# Patient Record
Sex: MALE | Race: WHITE | NOT HISPANIC OR LATINO | URBAN - METROPOLITAN AREA
[De-identification: names, ages, dates, MRNs, and addresses within clinical notes are randomized per-mention and may not be internally consistent; named-entity substitution may affect disease eponyms.]

---

## 2020-04-27 PROBLEM — R19.4 CHANGE IN BOWEL HABITS: Status: INACTIVE | Noted: 2020-02-03

## 2020-04-27 PROBLEM — K57.30 DVRTCLOS OF LG INT W/O PERFORATION OR ABSCESS W/O BLEEDING: Status: INACTIVE | Noted: 2020-02-03

## 2020-04-27 PROBLEM — K92.2 GASTROINTESTINAL HEMORRHAGE, UNSPECIFIED: Status: INACTIVE | Noted: 2017-07-13

## 2020-04-27 PROBLEM — K51.90 ULCERATIVE COLITIS, UNSPECIFIED, WITHOUT COMPLICATIONS: Status: INACTIVE | Noted: 2020-02-03

## 2021-10-26 PROBLEM — K63.3 ULCER OF INTESTINE: Status: INACTIVE | Noted: 2020-02-03

## 2024-01-25 PROBLEM — K57.30 DIVERTICULOSIS OF LARGE INTESTINE W/O PERFORATION/ABSCESS W/O BLEEDING: Status: ACTIVE | Noted: 2017-07-13

## 2024-01-25 PROBLEM — D12.2 BENIGN NEOPLASM OF ASCENDING COLON: Status: ACTIVE | Noted: 2017-07-13

## 2024-08-27 ENCOUNTER — OFFICE (OUTPATIENT)
Age: 64
End: 2024-08-27

## 2024-08-27 ENCOUNTER — OFFICE (OUTPATIENT)
Dept: URBAN - METROPOLITAN AREA INFUSION 5 | Facility: INFUSION | Age: 64
End: 2024-08-27

## 2024-08-27 VITALS
TEMPERATURE: 98.4 F | DIASTOLIC BLOOD PRESSURE: 67 MMHG | SYSTOLIC BLOOD PRESSURE: 116 MMHG | TEMPERATURE: 98.4 F | HEIGHT: 68 IN | HEART RATE: 80 BPM | DIASTOLIC BLOOD PRESSURE: 70 MMHG | HEART RATE: 80 BPM | HEIGHT: 68 IN | SYSTOLIC BLOOD PRESSURE: 116 MMHG | SYSTOLIC BLOOD PRESSURE: 116 MMHG | HEART RATE: 74 BPM | SYSTOLIC BLOOD PRESSURE: 123 MMHG | HEART RATE: 89 BPM | HEART RATE: 74 BPM | HEART RATE: 89 BPM | SYSTOLIC BLOOD PRESSURE: 116 MMHG | HEIGHT: 68 IN | HEART RATE: 80 BPM | TEMPERATURE: 97.6 F | DIASTOLIC BLOOD PRESSURE: 70 MMHG | SYSTOLIC BLOOD PRESSURE: 123 MMHG | SYSTOLIC BLOOD PRESSURE: 123 MMHG | DIASTOLIC BLOOD PRESSURE: 70 MMHG | DIASTOLIC BLOOD PRESSURE: 67 MMHG | RESPIRATION RATE: 16 BRPM | RESPIRATION RATE: 16 BRPM | DIASTOLIC BLOOD PRESSURE: 67 MMHG | SYSTOLIC BLOOD PRESSURE: 117 MMHG | HEART RATE: 74 BPM | HEIGHT: 68 IN | HEART RATE: 89 BPM | HEART RATE: 80 BPM | SYSTOLIC BLOOD PRESSURE: 116 MMHG | HEART RATE: 89 BPM | HEART RATE: 74 BPM | TEMPERATURE: 98.4 F | TEMPERATURE: 98.4 F | SYSTOLIC BLOOD PRESSURE: 117 MMHG | TEMPERATURE: 97.6 F | DIASTOLIC BLOOD PRESSURE: 67 MMHG | TEMPERATURE: 98.4 F | DIASTOLIC BLOOD PRESSURE: 67 MMHG | TEMPERATURE: 97.6 F | TEMPERATURE: 97.6 F | SYSTOLIC BLOOD PRESSURE: 116 MMHG | HEART RATE: 74 BPM | HEIGHT: 68 IN | TEMPERATURE: 97.6 F | HEART RATE: 89 BPM | SYSTOLIC BLOOD PRESSURE: 117 MMHG | HEART RATE: 89 BPM | DIASTOLIC BLOOD PRESSURE: 70 MMHG | SYSTOLIC BLOOD PRESSURE: 123 MMHG | SYSTOLIC BLOOD PRESSURE: 117 MMHG | TEMPERATURE: 97.6 F | HEIGHT: 68 IN | HEART RATE: 74 BPM | RESPIRATION RATE: 16 BRPM | HEART RATE: 89 BPM | SYSTOLIC BLOOD PRESSURE: 117 MMHG | HEART RATE: 80 BPM | SYSTOLIC BLOOD PRESSURE: 117 MMHG | TEMPERATURE: 98.4 F | DIASTOLIC BLOOD PRESSURE: 67 MMHG | RESPIRATION RATE: 16 BRPM | SYSTOLIC BLOOD PRESSURE: 123 MMHG | DIASTOLIC BLOOD PRESSURE: 70 MMHG | RESPIRATION RATE: 16 BRPM | HEART RATE: 74 BPM | DIASTOLIC BLOOD PRESSURE: 70 MMHG | SYSTOLIC BLOOD PRESSURE: 123 MMHG | SYSTOLIC BLOOD PRESSURE: 117 MMHG | RESPIRATION RATE: 16 BRPM | HEIGHT: 68 IN | DIASTOLIC BLOOD PRESSURE: 67 MMHG | RESPIRATION RATE: 16 BRPM | SYSTOLIC BLOOD PRESSURE: 116 MMHG | DIASTOLIC BLOOD PRESSURE: 70 MMHG | SYSTOLIC BLOOD PRESSURE: 123 MMHG | HEART RATE: 80 BPM | TEMPERATURE: 97.6 F | HEART RATE: 80 BPM | TEMPERATURE: 98.4 F

## 2024-08-27 DIAGNOSIS — K51.00 ULCERATIVE (CHRONIC) PANCOLITIS WITHOUT COMPLICATIONS: ICD-10-CM

## 2024-08-27 PROCEDURE — 96365 THER/PROPH/DIAG IV INF INIT: CPT | Performed by: INTERNAL MEDICINE

## 2024-09-24 ENCOUNTER — ON CAMPUS - OUTPATIENT (OUTPATIENT)
Age: 64
End: 2024-09-24

## 2024-09-24 ENCOUNTER — ON CAMPUS - OUTPATIENT (OUTPATIENT)
Dept: URBAN - METROPOLITAN AREA HOSPITAL 108 | Facility: HOSPITAL | Age: 64
End: 2024-09-24

## 2024-09-24 DIAGNOSIS — K31.7 POLYP OF STOMACH AND DUODENUM: ICD-10-CM

## 2024-09-24 DIAGNOSIS — Z86.010 PERSONAL HISTORY OF COLON POLYPS: ICD-10-CM

## 2024-09-24 DIAGNOSIS — D12.2 BENIGN NEOPLASM OF ASCENDING COLON: ICD-10-CM

## 2024-09-24 DIAGNOSIS — Z09 ENCOUNTER FOR FOLLOW-UP EXAMINATION AFTER COMPLETED TREATMEN: ICD-10-CM

## 2024-09-24 DIAGNOSIS — K57.30 DIVERTICULOSIS OF LARGE INTESTINE WITHOUT PERFORATION OR ABS: ICD-10-CM

## 2024-09-24 DIAGNOSIS — K21.9 GASTRO-ESOPHAGEAL REFLUX DISEASE WITHOUT ESOPHAGITIS: ICD-10-CM

## 2024-09-24 DIAGNOSIS — K22.89 OTHER SPECIFIED DISEASE OF ESOPHAGUS: ICD-10-CM

## 2024-09-24 DIAGNOSIS — R10.13 EPIGASTRIC PAIN: ICD-10-CM

## 2024-09-24 DIAGNOSIS — K29.50 UNSPECIFIED CHRONIC GASTRITIS WITHOUT BLEEDING: ICD-10-CM

## 2024-09-24 PROCEDURE — 45380 COLONOSCOPY AND BIOPSY: CPT | Mod: PT | Performed by: INTERNAL MEDICINE

## 2024-09-24 PROCEDURE — 43239 EGD BIOPSY SINGLE/MULTIPLE: CPT | Performed by: INTERNAL MEDICINE

## 2024-10-21 PROBLEM — K51.00 ULCERATIVE (CHRONIC) PANCOLITIS WITHOUT COMPLICATIONS: Status: ACTIVE | Noted: 2020-02-03

## 2024-10-22 ENCOUNTER — OFFICE (OUTPATIENT)
Dept: URBAN - METROPOLITAN AREA INFUSION 5 | Facility: INFUSION | Age: 64
End: 2024-10-22

## 2024-10-22 ENCOUNTER — OFFICE (OUTPATIENT)
Age: 64
End: 2024-10-22

## 2024-10-22 VITALS
TEMPERATURE: 98.3 F | HEART RATE: 63 BPM | HEART RATE: 60 BPM | RESPIRATION RATE: 16 BRPM | SYSTOLIC BLOOD PRESSURE: 100 MMHG | SYSTOLIC BLOOD PRESSURE: 100 MMHG | TEMPERATURE: 98.2 F | DIASTOLIC BLOOD PRESSURE: 60 MMHG | HEIGHT: 68 IN | HEART RATE: 60 BPM | HEART RATE: 70 BPM | SYSTOLIC BLOOD PRESSURE: 102 MMHG | SYSTOLIC BLOOD PRESSURE: 105 MMHG | HEIGHT: 68 IN | DIASTOLIC BLOOD PRESSURE: 60 MMHG | SYSTOLIC BLOOD PRESSURE: 102 MMHG | TEMPERATURE: 98.2 F | DIASTOLIC BLOOD PRESSURE: 60 MMHG | DIASTOLIC BLOOD PRESSURE: 59 MMHG | HEART RATE: 70 BPM | SYSTOLIC BLOOD PRESSURE: 102 MMHG | DIASTOLIC BLOOD PRESSURE: 60 MMHG | TEMPERATURE: 98.3 F | TEMPERATURE: 98.2 F | RESPIRATION RATE: 16 BRPM | SYSTOLIC BLOOD PRESSURE: 100 MMHG | SYSTOLIC BLOOD PRESSURE: 105 MMHG | HEART RATE: 70 BPM | HEART RATE: 63 BPM | HEART RATE: 63 BPM | RESPIRATION RATE: 16 BRPM | HEIGHT: 68 IN | DIASTOLIC BLOOD PRESSURE: 60 MMHG | HEART RATE: 70 BPM | SYSTOLIC BLOOD PRESSURE: 105 MMHG | RESPIRATION RATE: 16 BRPM | DIASTOLIC BLOOD PRESSURE: 62 MMHG | HEART RATE: 60 BPM | RESPIRATION RATE: 16 BRPM | HEART RATE: 63 BPM | SYSTOLIC BLOOD PRESSURE: 100 MMHG | RESPIRATION RATE: 16 BRPM | DIASTOLIC BLOOD PRESSURE: 62 MMHG | DIASTOLIC BLOOD PRESSURE: 59 MMHG | SYSTOLIC BLOOD PRESSURE: 100 MMHG | TEMPERATURE: 98.3 F | TEMPERATURE: 98.3 F | HEART RATE: 70 BPM | HEART RATE: 70 BPM | SYSTOLIC BLOOD PRESSURE: 102 MMHG | SYSTOLIC BLOOD PRESSURE: 105 MMHG | SYSTOLIC BLOOD PRESSURE: 100 MMHG | HEART RATE: 60 BPM | DIASTOLIC BLOOD PRESSURE: 62 MMHG | HEART RATE: 63 BPM | DIASTOLIC BLOOD PRESSURE: 60 MMHG | TEMPERATURE: 98.3 F | HEIGHT: 68 IN | SYSTOLIC BLOOD PRESSURE: 102 MMHG | DIASTOLIC BLOOD PRESSURE: 62 MMHG | TEMPERATURE: 98.3 F | HEART RATE: 60 BPM | HEART RATE: 70 BPM | HEART RATE: 63 BPM | SYSTOLIC BLOOD PRESSURE: 102 MMHG | TEMPERATURE: 98.2 F | SYSTOLIC BLOOD PRESSURE: 105 MMHG | DIASTOLIC BLOOD PRESSURE: 59 MMHG | HEIGHT: 68 IN | HEART RATE: 63 BPM | TEMPERATURE: 98.3 F | HEART RATE: 60 BPM | HEIGHT: 68 IN | DIASTOLIC BLOOD PRESSURE: 60 MMHG | DIASTOLIC BLOOD PRESSURE: 59 MMHG | DIASTOLIC BLOOD PRESSURE: 59 MMHG | SYSTOLIC BLOOD PRESSURE: 102 MMHG | HEART RATE: 60 BPM | DIASTOLIC BLOOD PRESSURE: 62 MMHG | SYSTOLIC BLOOD PRESSURE: 105 MMHG | TEMPERATURE: 98.2 F | DIASTOLIC BLOOD PRESSURE: 59 MMHG | TEMPERATURE: 98.2 F | SYSTOLIC BLOOD PRESSURE: 100 MMHG | RESPIRATION RATE: 16 BRPM | DIASTOLIC BLOOD PRESSURE: 62 MMHG | HEIGHT: 68 IN | DIASTOLIC BLOOD PRESSURE: 62 MMHG | TEMPERATURE: 98.2 F | DIASTOLIC BLOOD PRESSURE: 59 MMHG | SYSTOLIC BLOOD PRESSURE: 105 MMHG

## 2024-10-22 DIAGNOSIS — K51.00 ULCERATIVE (CHRONIC) PANCOLITIS WITHOUT COMPLICATIONS: ICD-10-CM

## 2024-10-22 PROCEDURE — 96365 THER/PROPH/DIAG IV INF INIT: CPT | Performed by: INTERNAL MEDICINE

## 2024-10-22 NOTE — SERVICENOTES
NEGATIVE PPD or Quantiferon Gold:7/2024
MEDS PROVIDED BY [facility]
Labs obtained prior to infusion

## 2024-12-17 ENCOUNTER — OFFICE (OUTPATIENT)
Dept: URBAN - METROPOLITAN AREA INFUSION 5 | Facility: INFUSION | Age: 64
End: 2024-12-17
Payer: COMMERCIAL

## 2024-12-17 VITALS
DIASTOLIC BLOOD PRESSURE: 71 MMHG | SYSTOLIC BLOOD PRESSURE: 117 MMHG | DIASTOLIC BLOOD PRESSURE: 75 MMHG | RESPIRATION RATE: 16 BRPM | TEMPERATURE: 97.9 F | SYSTOLIC BLOOD PRESSURE: 115 MMHG | HEART RATE: 55 BPM | HEART RATE: 54 BPM | TEMPERATURE: 97.8 F | DIASTOLIC BLOOD PRESSURE: 69 MMHG | SYSTOLIC BLOOD PRESSURE: 116 MMHG | HEIGHT: 68 IN

## 2024-12-17 DIAGNOSIS — K51.00 ULCERATIVE (CHRONIC) PANCOLITIS WITHOUT COMPLICATIONS: ICD-10-CM

## 2024-12-17 PROCEDURE — 96365 THER/PROPH/DIAG IV INF INIT: CPT | Performed by: INTERNAL MEDICINE

## 2024-12-17 NOTE — SERVICENOTES
NEGATIVE PPD or Quantiferon Gold:7/2024
MEDS PROVIDED BY [facility]

## 2024-12-29 VITALS
DIASTOLIC BLOOD PRESSURE: 71 MMHG | WEIGHT: 220 LBS | HEART RATE: 55 BPM | HEIGHT: 68 IN | SYSTOLIC BLOOD PRESSURE: 117 MMHG

## 2024-12-29 PROBLEM — K57.30 DIVERTICULOSIS OF LARGE INTESTINE W/O PERFORATION/ABSCESS W/O BLEEDING: Status: ACTIVE | Noted: 2017-07-13

## 2024-12-29 PROBLEM — K51.00 ULCERATIVE (CHRONIC) PANCOLITIS WITHOUT COMPLICATIONS: Status: ACTIVE | Noted: 2020-02-03

## 2024-12-30 ENCOUNTER — OFFICE (OUTPATIENT)
Dept: URBAN - METROPOLITAN AREA CLINIC 76 | Facility: CLINIC | Age: 64
End: 2024-12-30
Payer: MEDICARE

## 2024-12-30 DIAGNOSIS — Z86.0101 PERSONAL HISTORY OF ADENOMATOUS AND SERRATED COLON POLYPS: ICD-10-CM

## 2024-12-30 DIAGNOSIS — I48.91 UNSPECIFIED ATRIAL FIBRILLATION: ICD-10-CM

## 2024-12-30 DIAGNOSIS — J84.10 PULMONARY FIBROSIS, UNSPECIFIED: ICD-10-CM

## 2024-12-30 DIAGNOSIS — R93.3 ABNORMAL FINDINGS ON DIAGNOSTIC IMAGING OF OTHER PARTS OF DI: ICD-10-CM

## 2024-12-30 DIAGNOSIS — K21.9 GASTRO-ESOPHAGEAL REFLUX DISEASE WITHOUT ESOPHAGITIS: ICD-10-CM

## 2024-12-30 DIAGNOSIS — K51.00 ULCERATIVE (CHRONIC) PANCOLITIS WITHOUT COMPLICATIONS: ICD-10-CM

## 2024-12-30 DIAGNOSIS — K80.20 CALCULUS OF GALLBLADDER WITHOUT CHOLECYSTITIS WITHOUT OBSTRU: ICD-10-CM

## 2024-12-30 DIAGNOSIS — K85.90 ACUTE PANCREATITIS WITHOUT NECROSIS OR INFECTION, UNSPECIFIE: ICD-10-CM

## 2024-12-30 DIAGNOSIS — K57.30 DIVERTICULOSIS OF LARGE INTESTINE WITHOUT PERFORATION OR ABS: ICD-10-CM

## 2024-12-30 DIAGNOSIS — K76.0 FATTY (CHANGE OF) LIVER, NOT ELSEWHERE CLASSIFIED: ICD-10-CM

## 2024-12-30 DIAGNOSIS — R10.13 EPIGASTRIC PAIN: ICD-10-CM

## 2024-12-30 PROCEDURE — 99214 OFFICE O/P EST MOD 30 MIN: CPT | Performed by: INTERNAL MEDICINE

## 2024-12-30 RX ORDER — OMEPRAZOLE 20 MG/1
20 CAPSULE, DELAYED RELEASE ORAL
Qty: 90 | Refills: 3 | Status: ACTIVE

## 2025-02-06 PROBLEM — K51.00 ULCERATIVE (CHRONIC) PANCOLITIS WITHOUT COMPLICATIONS: Status: ACTIVE | Noted: 2020-02-03

## 2025-04-08 ENCOUNTER — OFFICE (OUTPATIENT)
Dept: URBAN - METROPOLITAN AREA INFUSION 5 | Facility: INFUSION | Age: 65
End: 2025-04-08
Payer: MEDICARE

## 2025-04-08 VITALS
DIASTOLIC BLOOD PRESSURE: 76 MMHG | HEIGHT: 68 IN | HEART RATE: 449 BPM | SYSTOLIC BLOOD PRESSURE: 112 MMHG | TEMPERATURE: 98.3 F | SYSTOLIC BLOOD PRESSURE: 113 MMHG | DIASTOLIC BLOOD PRESSURE: 71 MMHG | HEART RATE: 53 BPM | TEMPERATURE: 97.6 F | SYSTOLIC BLOOD PRESSURE: 125 MMHG | RESPIRATION RATE: 16 BRPM | DIASTOLIC BLOOD PRESSURE: 79 MMHG | HEART RATE: 52 BPM

## 2025-04-08 DIAGNOSIS — K51.00 ULCERATIVE (CHRONIC) PANCOLITIS WITHOUT COMPLICATIONS: ICD-10-CM

## 2025-04-08 PROCEDURE — 96365 THER/PROPH/DIAG IV INF INIT: CPT | Performed by: INTERNAL MEDICINE

## 2025-05-29 PROBLEM — K51.00 ULCERATIVE (CHRONIC) PANCOLITIS WITHOUT COMPLICATIONS: Status: ACTIVE | Noted: 2020-02-03

## 2025-06-03 ENCOUNTER — OFFICE (OUTPATIENT)
Dept: URBAN - METROPOLITAN AREA INFUSION 5 | Facility: INFUSION | Age: 65
End: 2025-06-03
Payer: MEDICARE

## 2025-06-03 DIAGNOSIS — K51.00 ULCERATIVE (CHRONIC) PANCOLITIS WITHOUT COMPLICATIONS: ICD-10-CM

## 2025-06-03 PROCEDURE — 96365 THER/PROPH/DIAG IV INF INIT: CPT | Performed by: INTERNAL MEDICINE

## 2025-06-30 PROBLEM — K51.00 ULCERATIVE (CHRONIC) PANCOLITIS WITHOUT COMPLICATIONS: Status: ACTIVE | Noted: 2020-02-03

## 2025-07-29 ENCOUNTER — OFFICE (OUTPATIENT)
Dept: URBAN - METROPOLITAN AREA INFUSION 5 | Facility: INFUSION | Age: 65
End: 2025-07-29
Payer: MEDICARE

## 2025-07-29 VITALS
RESPIRATION RATE: 16 BRPM | SYSTOLIC BLOOD PRESSURE: 113 MMHG | HEART RATE: 61 BPM | DIASTOLIC BLOOD PRESSURE: 70 MMHG | HEART RATE: 68 BPM | HEIGHT: 68 IN | DIASTOLIC BLOOD PRESSURE: 67 MMHG | SYSTOLIC BLOOD PRESSURE: 112 MMHG | SYSTOLIC BLOOD PRESSURE: 109 MMHG | TEMPERATURE: 98.2 F | HEART RATE: 62 BPM | DIASTOLIC BLOOD PRESSURE: 72 MMHG | TEMPERATURE: 97.6 F

## 2025-07-29 DIAGNOSIS — K51.00 ULCERATIVE (CHRONIC) PANCOLITIS WITHOUT COMPLICATIONS: ICD-10-CM

## 2025-07-29 PROCEDURE — 96365 THER/PROPH/DIAG IV INF INIT: CPT | Performed by: INTERNAL MEDICINE

## 2025-07-29 NOTE — SERVICENOTES
NEGATIVE PPD or Quantiferon Gold:6/2025
MEDS PROVIDED BY [facility]